# Patient Record
Sex: FEMALE | Race: WHITE | URBAN - METROPOLITAN AREA
[De-identification: names, ages, dates, MRNs, and addresses within clinical notes are randomized per-mention and may not be internally consistent; named-entity substitution may affect disease eponyms.]

---

## 2017-03-07 ENCOUNTER — OFFICE VISIT (OUTPATIENT)
Dept: OBGYN | Facility: CLINIC | Age: 67
End: 2017-03-07
Payer: COMMERCIAL

## 2017-03-07 VITALS
WEIGHT: 174 LBS | DIASTOLIC BLOOD PRESSURE: 66 MMHG | SYSTOLIC BLOOD PRESSURE: 152 MMHG | BODY MASS INDEX: 32.02 KG/M2 | HEIGHT: 62 IN

## 2017-03-07 DIAGNOSIS — R53.83 FATIGUE, UNSPECIFIED TYPE: ICD-10-CM

## 2017-03-07 DIAGNOSIS — C54.1 ENDOMETRIAL CANCER (H): Primary | ICD-10-CM

## 2017-03-07 LAB — TSH SERPL DL<=0.005 MIU/L-ACNC: 1.05 MU/L (ref 0.4–4)

## 2017-03-07 PROCEDURE — 99213 OFFICE O/P EST LOW 20 MIN: CPT | Performed by: OBSTETRICS & GYNECOLOGY

## 2017-03-07 PROCEDURE — 87624 HPV HI-RISK TYP POOLED RSLT: CPT | Performed by: OBSTETRICS & GYNECOLOGY

## 2017-03-07 PROCEDURE — 88175 CYTOPATH C/V AUTO FLUID REDO: CPT | Performed by: OBSTETRICS & GYNECOLOGY

## 2017-03-07 PROCEDURE — 86800 THYROGLOBULIN ANTIBODY: CPT | Performed by: OBSTETRICS & GYNECOLOGY

## 2017-03-07 PROCEDURE — 84443 ASSAY THYROID STIM HORMONE: CPT | Performed by: OBSTETRICS & GYNECOLOGY

## 2017-03-07 PROCEDURE — 36415 COLL VENOUS BLD VENIPUNCTURE: CPT | Performed by: OBSTETRICS & GYNECOLOGY

## 2017-03-07 NOTE — LETTER
[facility] [address] , [address]  [place]   [id number] [phone]      3/14/2017     Sydni Bonilla    [address] [address]  [place] [address]      Dear Sydni,  We are happy to inform you that your PAP smear result is normal.  We are now able to do a follow up test on PAP smears. The DNA test is for HPV (Human Papilloma Virus). Cervical cancer is closely linked with certain types of HPV. Your result showed no evidence of HPV.  Therefore we recommend you return in 6 months for your next pap smear.  You will still need to return to the clinic every year for an annual exam and other preventive tests.  Please contact the clinic with any questions.  Sincerely,    Fidencio Soto MD

## 2017-03-07 NOTE — MR AVS SNAPSHOT
After Visit Summary   3/7/2017    Sydni Bonilla    MRN: [id number]           Patient Information     Date Of Birth          1950        Visit Information        Provider Department      3/7/2017 1:15 PM Fidencio Mora MD [facility]        Today's Diagnoses     Endometrial cancer (H)    -  1    Fatigue, unspecified type           Follow-ups after your visit        Your next 10 appointments already scheduled     Jun 06, 2017  1:15 PM CDT   SHORT with Fidencio Mora MD   [facility]    [address] [place] [id number] [phone]              Who to contact     If you have questions or need follow up information about today's clinic visit or your schedule please contact [facility] directly at [phone].  Normal or non-critical lab and imaging results will be communicated to you by MyChart, letter or phone within 4 business days after the clinic has received the results. If you do not hear from us within 7 days, please contact the clinic through [employer] or phone. If you have a critical or abnormal lab result, we will notify you by phone as soon as possible.  Submit refill requests through [employer] or call your pharmacy and they will forward the refill request to us. Please allow 3 business days for your refill to be completed.          Additional Information About Your Visit        MyChart Information     [employer] gives you secure access to your electronic health record. If you see a primary care provider, you can also send messages to your care team and make appointments. If you have questions, please call your primary care clinic.  If you do not have a primary care provider, please call [phone] and they will assist you.        Care EveryWhere ID     This is your Care EveryWhere ID. This could be used by other organizations to access your [facility]  "records  [id number]        Your Vitals Were     Height BMI (Body Mass Index)                5' 2\" (1.575 m) 31.83 kg/m2           Blood Pressure from Last 3 Encounters:   03/07/17 152/66   12/06/16 138/80   08/25/16 130/70    Weight from Last 3 Encounters:   03/07/17 174 lb (78.9 kg)   12/06/16 170 lb (77.1 kg)   08/25/16 174 lb (78.9 kg)              We Performed the Following     Anti thyroglobulin antibody     TSH with free T4 reflex        Primary Care Provider Office Phone # Fax #    Joan Vines [phone]       [facility] [address] [address]        Thank you!     Thank you for choosing [facility]  for your care. Our goal is always to provide you with excellent care. Hearing back from our patients is one way we can continue to improve our services. Please take a few minutes to complete the written survey that you may receive in the mail after your visit with us. Thank you!             Your Updated Medication List - Protect others around you: Learn how to safely use, store and throw away your medicines at www.disposemymeds.org.          This list is accurate as of: 3/7/17  2:20 PM.  Always use your most recent med list.                   Brand Name Dispense Instructions for use    aspirin EC 81 MG EC tablet      Take 1 tablet by mouth       Blood Glucose Monitor System W/DEVICE Kit      Test 4 times a day, One touch Ultra blue test strips 3 months supply       carvedilol 6.25 MG tablet    COREG     Take 1 tablet by mouth 2 times daily (with meals).       FUROSEMIDE PO      Take 10 mg by mouth daily 1/2 of 20 mg tablet       ibuprofen 800 MG tablet    ADVIL/MOTRIN    30 tablet    Take 1 tablet (800 mg) by mouth every 8 hours as needed for moderate pain       insulin glargine 100 UNIT/ML injection    LANTUS     Inject 25 Units Subcutaneous At Bedtime       metFORMIN 500 MG 24 hr tablet    GLUCOPHAGE-XR     Take 1,500 mg by mouth " "daily (with breakfast)       Multi-vitamin Tabs tablet      Take 1 tablet by mouth daily       nitroglycerin 0.4 MG sublingual tablet    NITROSTAT     Place 0.4 mg under the tongue as needed for chest pain       OMEGA-3 FISH OIL PO      Take 2.8 g by mouth daily       pen needles 5/16\" 31G X 8 MM Misc      Dispense: Insulin Syringes:  1/2 cc syringes (# 270 )  - regular length 28 mm  needle       SPIRONOLACTONE PO      Take 25 mg by mouth every evening 1/2 of 50 mg tablet       VITAMIN D3 PO      Take 20,000 Units by mouth daily Liquid Vitamin D 2000 units per drop and takes 10 drops in smoothie.         "

## 2017-03-07 NOTE — PROGRESS NOTES
SUBJECTIVE:                                                   Sydni Bonilla is a 67 year old female who presents to clinic today for the following health issue(s):  Patient presents with:  RECHECK: 3 month follow-up uterine cancer.      HPI: The patient this time for follow-up of endometrial cancer first diagnosed and treated in November 2015. She has no positive GYN review of systems at this time. She has some strange arthritis symptoms and has a worsening of her coronary artery disease. She has an internist at his following these issues. She is desirous of a full set of thyroid functions.      No LMP recorded. Patient is postmenopausal..   Patient is not sexually active,Using hysterectomy for contraception.    reports that she has never smoked. She has never used smokeless tobacco.  Health maintenance updated:  yes    Today's PHQ-2 Score:   PHQ-2 ( 1999 Pfizer) 3/7/2017   Q1: Little interest or pleasure in doing things 0   Q2: Feeling down, depressed or hopeless 0   PHQ-2 Score 0       Problem list and histories reviewed & adjusted, as indicated.  Additional history: as documented.    Patient Active Problem List   Diagnosis     CARDIOVASCULAR SCREENING; LDL GOAL LESS THAN 160     Endometrial cancer (H)     Adenocarcinoma of the endometrium/uterus (H)     Uterine cancer (H)     Aphasia     Past Surgical History   Procedure Laterality Date     Colonoscopy       Gyn surgery       D&C x3     Ent surgery       tonsillectomy     Hrw hb pf i&d sebaceous cyst       x2     Hysterectomy total abdominal, bilateral salpingo-oophorectomy, combined N/A 11/12/2015     Procedure: COMBINED HYSTERECTOMY TOTAL ABDOMINAL, SALPINGO-OOPHORECTOMY;  Surgeon: Fidencio Mora MD;  [facility]: [facility]      Social History   Substance Use Topics     Smoking status: Never Smoker     Smokeless tobacco: Never Used     Alcohol use No           Current Outpatient Prescriptions   Medication Sig     insulin glargine (LANTUS) 100 UNIT/ML vial  "Inject 25 Units Subcutaneous At Bedtime     metFORMIN (GLUCOPHAGE-XR) 500 MG 24 hr tablet Take 1,500 mg by mouth daily (with breakfast)     aspirin EC 81 MG tablet Take 1 tablet by mouth     Insulin Pen Needle (PEN NEEDLES 5/16\") 31G X 8 MM MISC Dispense: Insulin Syringes:  1/2 cc syringes (# 270 )  - regular length 28 mm  needle     Blood Glucose Monitoring Suppl (BLOOD GLUCOSE MONITOR SYSTEM) W/DEVICE KIT Test 4 times a day, One touch Ultra blue test strips 3 months supply     ibuprofen (ADVIL,MOTRIN) 800 MG tablet Take 1 tablet (800 mg) by mouth every 8 hours as needed for moderate pain     FUROSEMIDE PO Take 10 mg by mouth daily 1/2 of 20 mg tablet     SPIRONOLACTONE PO Take 25 mg by mouth every evening 1/2 of 50 mg tablet     Cholecalciferol (VITAMIN D3 PO) Take 20,000 Units by mouth daily Liquid Vitamin D 2000 units per drop and takes 10 drops in smoothie.     nitroglycerin (NITROSTAT) 0.4 MG SL tablet Place 0.4 mg under the tongue as needed for chest pain     Omega-3 Fatty Acids (OMEGA-3 FISH OIL PO) Take 2.8 g by mouth daily      multivitamin, therapeutic with minerals (MULTI-VITAMIN) TABS Take 1 tablet by mouth daily     carvedilol (COREG) 6.25 MG tablet Take 1 tablet by mouth 2 times daily (with meals).     No current facility-administered medications for this visit.      Allergies   Allergen Reactions     Adhesive Tape      Amlodipine      Apple      Avandia [Rosiglitazone]      Benicar [Olmesartan]      Beta Adrenergic Blockers      Birch Trees      Caffeine      Bianchi      Other reaction(s): Other (see comments)  Tingling and tightness in throat and mouth.     Cherry [Wild Cherry Syrup]      Chlorthalidone      Clonidine      Coffee Bean Extract [Coffea Arabica]      Cortisone      Cozaar [Losartan]      Dyazide [Hydrochlorothiazide W/Triamterene]      Flagyl [Metronidazole]      Gluten Meal      Hc [Hydrocortisone]      Hmg-Coa-R Inhibitors      Hytrin [Terazosin]      Latex      Lisinopril      Lopid " "[Gemfibrozil]      Lozol [Indapamide]      Maple Tree      Metformin      Mold      Neomycin-Polymyxin B Gu      No Clinical Screening - See Comments      \"too many to remember\"     Oak Trees      Oatmeal      Peanut Oil      Rosuvastatin      Soy Allergy      Sulfa Drugs      Triamcinolone      Verapamil      Yeast        ROS:  12 point review of systems negative other than symptoms noted below.    OBJECTIVE:     /66  Ht 5' 2\" (1.575 m)  Wt 174 lb (78.9 kg)  BMI 31.83 kg/m2  Body mass index is 31.83 kg/(m^2).    Exam:  Constitutional:  Appearance: Well nourished, well developed alert, in no acute distress  Neck:  Lymph Nodes:  No lymphadenopathy present; Thyroid:  Gland size normal, nontender, no nodules or masses present on palpation  Chest:  Respiratory Effort:  Breathing unlabored  Cardiovascular: Heart: Auscultation:  Regular rate, normal rhythm, no murmurs present  Gastrointestinal:  Abdominal Examination:  Abdomen nontender to palpation, tone normal without rigidity or guarding, no masses present, umbilicus without lesions; Liver/Spleen:  No hepatomegaly present, liver nontender to palpation; Hernias:  No hernias present  Lymphatic: Lymph Nodes:  No other lymphadenopathy present  Skin:General Inspection:  No rashes present, no lesions present, no areas of discoloration; Genitalia and Groin:  No rashes present, no lesions present, no areas of discoloration, no masses present.  Neurologic/Psychiatric:  Mental Status:  Oriented X3   Pelvic Exam:  External Genitalia:     Normal appearance for age, no discharge present, no tenderness present, no inflammatory lesions present, color normal  Vagina:     Normal vaginal vault without central or paravaginal defects, no discharge present, no inflammatory lesions present, no masses present  Bladder:     Nontender to palpation  Urethra:   Urethral Body:  Urethra palpation normal, urethra structural support normal   Urethral Meatus:  No erythema or lesions " present  Cervix:     Surgically absent  Uterus:     Surgically absent  Adnexa:     Surgically absent  Perineum:     Perineum within normal limits, no evidence of trauma, no rashes or skin lesions present  Anus:     Anus within normal limits, no hemorrhoids present  Inguinal Lymph Nodes:     No lymphadenopathy present     In-Clinic Test Results:      ASSESSMENT/PLAN:                                                      The patient is seen at this time for follow-up of adenocarcinoma the endometrium. She has no evidence of recurrence. A Pap smear is pending. We will draw some thyroid functions and contact her with the results.            Fidencio Mora MD  [facility] [place]

## 2017-03-08 LAB — THYROGLOB AB SERPL IA-ACNC: <20 IU/ML (ref 0–40)

## 2017-03-10 LAB
COPATH REPORT: NORMAL
PAP: NORMAL

## 2017-03-13 LAB
FINAL DIAGNOSIS: NORMAL
HPV HR 12 DNA CVX QL NAA+PROBE: NEGATIVE
HPV16 DNA SPEC QL NAA+PROBE: NEGATIVE
HPV18 DNA SPEC QL NAA+PROBE: NEGATIVE
SPECIMEN DESCRIPTION: NORMAL

## 2017-03-15 ENCOUNTER — TELEPHONE (OUTPATIENT)
Dept: NURSING | Facility: CLINIC | Age: 67
End: 2017-03-15

## 2017-03-15 NOTE — TELEPHONE ENCOUNTER
Pt called requesting lab results from her visit 3/7/17. Pt give TSH and Anti thyroglobulin Antibody results. Pt results that test be released to my chart. Pt has an appointment with endocrinology soon.  Results released to My Chart.

## 2017-06-06 ENCOUNTER — OFFICE VISIT (OUTPATIENT)
Dept: OBGYN | Facility: CLINIC | Age: 67
End: 2017-06-06
Payer: COMMERCIAL

## 2017-06-06 VITALS
WEIGHT: 175 LBS | BODY MASS INDEX: 32.2 KG/M2 | HEIGHT: 62 IN | SYSTOLIC BLOOD PRESSURE: 124 MMHG | DIASTOLIC BLOOD PRESSURE: 72 MMHG

## 2017-06-06 DIAGNOSIS — C54.1 ENDOMETRIAL CANCER (H): Primary | ICD-10-CM

## 2017-06-06 PROCEDURE — 99213 OFFICE O/P EST LOW 20 MIN: CPT | Performed by: OBSTETRICS & GYNECOLOGY

## 2017-06-06 PROCEDURE — 88175 CYTOPATH C/V AUTO FLUID REDO: CPT | Performed by: OBSTETRICS & GYNECOLOGY

## 2017-06-06 NOTE — PROGRESS NOTES
SUBJECTIVE:                                                   Sydni Bonilla is a 67 year old female who presents to clinic today for the following health issue(s):  Patient presents with:  Repeat Pap Smear: Would like to not do the HPV testing if possible, says it's $100      HPI: The patient is seen at this time for her latest follow-up from adenocarcinoma of the endometrium diagnosed in November 2015. She is asymptomatic with no respiratory GI or  problems. She denies any vaginal bleeding. She does have a recent problem with gout and is treating this with herbal medicine. The patient refuses mammography due to radiation exposure.      No LMP recorded. Patient is postmenopausal..   Patient is not sexually active, No obstetric history on file..  Using not sexually active for contraception.    reports that she has never smoked. She has never used smokeless tobacco.    STD testing offered?  Declined    Health maintenance updated:  yes    Today's PHQ-2 Score:   PHQ-2 ( 1999 Pfizer) 3/7/2017   Q1: Little interest or pleasure in doing things 0   Q2: Feeling down, depressed or hopeless 0   PHQ-2 Score 0     Today's PHQ-9 Score: No flowsheet data found.  Today's MONICO-7 Score: No flowsheet data found.    Problem list and histories reviewed & adjusted, as indicated.  Additional history: as documented.    Patient Active Problem List   Diagnosis     CARDIOVASCULAR SCREENING; LDL GOAL LESS THAN 160     Endometrial cancer (H)     Adenocarcinoma of the endometrium/uterus (H)     Uterine cancer (H)     Aphasia     Past Surgical History:   Procedure Laterality Date     COLONOSCOPY       ENT SURGERY      tonsillectomy     GYN SURGERY      D&C x3     HRW HB PF I&D SEBACEOUS CYST      x2     HYSTERECTOMY TOTAL ABDOMINAL, BILATERAL SALPINGO-OOPHORECTOMY, COMBINED N/A 11/12/2015    Procedure: COMBINED HYSTERECTOMY TOTAL ABDOMINAL, SALPINGO-OOPHORECTOMY;  Surgeon: Fidencio Mora MD;  Location: [facility]      Social History  "  Substance Use Topics     Smoking status: Never Smoker     Smokeless tobacco: Never Used     Alcohol use No           Current Outpatient Prescriptions   Medication Sig     insulin glargine (LANTUS) 100 UNIT/ML vial Inject 25 Units Subcutaneous At Bedtime     metFORMIN (GLUCOPHAGE-XR) 500 MG 24 hr tablet Take 1,500 mg by mouth daily (with breakfast)     aspirin EC 81 MG tablet Take 1 tablet by mouth     Insulin Pen Needle (PEN NEEDLES 5/16\") 31G X 8 MM MISC Dispense: Insulin Syringes:  1/2 cc syringes (# 270 )  - regular length 28 mm  needle     Blood Glucose Monitoring Suppl (BLOOD GLUCOSE MONITOR SYSTEM) W/DEVICE KIT Test 4 times a day, One touch Ultra blue test strips 3 months supply     FUROSEMIDE PO Take 10 mg by mouth daily 1/2 of 20 mg tablet     SPIRONOLACTONE PO Take 25 mg by mouth every evening 1/2 of 50 mg tablet     Cholecalciferol (VITAMIN D3 PO) Take 20,000 Units by mouth daily Liquid Vitamin D 2000 units per drop and takes 10 drops in smoothie.     Omega-3 Fatty Acids (OMEGA-3 FISH OIL PO) Take 2.8 g by mouth daily      multivitamin, therapeutic with minerals (MULTI-VITAMIN) TABS Take 1 tablet by mouth daily     carvedilol (COREG) 6.25 MG tablet Take 1 tablet by mouth 2 times daily (with meals).     ibuprofen (ADVIL,MOTRIN) 800 MG tablet Take 1 tablet (800 mg) by mouth every 8 hours as needed for moderate pain (Patient not taking: Reported on 6/6/2017)     nitroglycerin (NITROSTAT) 0.4 MG SL tablet Place 0.4 mg under the tongue as needed for chest pain     No current facility-administered medications for this visit.      Allergies   Allergen Reactions     Hytrin [Terazosin] Shortness Of Breath     No change in BP     Metformin      Other reaction(s): Other (see comments)  Acid reflux     Rosuvastatin      Other reaction(s): Abdominal Pain, Hyperglycemia, Myalgia     Adhesive Tape Itching     Amlodipine      Other reaction(s): Other (see comments)  Liver and upper abd pain     Apple      Other " "reaction(s): Other (see comments)  Tingling and tightness in throat     Atenolol Visual Disturbance     TIA type rx, vision loss     Avandia [Rosiglitazone]      Benicar [Olmesartan] Swelling     R sided edema, throat swelling & tightness     Beta Adrenergic Blockers      Birch Trees      Caffeine      Other reaction(s): Tachycardia     Cherry      Other reaction(s): Other (see comments)  Tingling and tightness in throat and mouth.  Other reaction(s): Other (see comments)  Tingling and tightness in throat and mouth.     Cherry [Wild Cherry Syrup]      Chlorthalidone      Other reaction(s): Other (see comments)  Potassium loss     Clonidine      Clonidine Derivatives      Other reaction(s): Other (see comments)  Thrush from patch, parotid pain from oral     Coffee Bean Extract [Coffea Arabica]      Cortisone      Cozaar [Losartan]      Dyazide [Hydrochlorothiazide W/Triamterene]      Other reaction(s): Other (see comments)  Severe abd cramping     Flagyl [Metronidazole]      Other reaction(s): Tachycardia     Gluten Meal      Hc [Hydrocortisone]      Hmg-Coa-R Inhibitors      Latex      Lisinopril Nausea and Vomiting     Lopid [Gemfibrozil]      Other reaction(s): Unknown     Lozol [Indapamide]      Other reaction(s): Unknown     Maple Tree      Mold      Molds & Smuts      Other reaction(s): Headache  Sorethroat, itchy ears,     Neomycin-Polymyxin B Gu      No Clinical Screening - See Comments      Other reaction(s): Abdominal Pain  Other reaction(s): Other (see comments)  HTN  \"too many to remember\"     Oak Trees      Oatmeal      Peanut Oil      Soy Allergy      Sulfa Drugs      Other reaction(s): Other (see comments)  Hemorrhages in white of eyes     Triamcinolone      Triple Antibiotic Itching     wound     Verapamil      Other reaction(s): Edema     Yeast      Other reaction(s): Other (see comments)  Patient states she develops Candida when eating yeast containing products. Vaginal odor, nasal discharge, " "fatigue, \"cloudy\" thinking.       ROS:  12 point review of systems negative other than symptoms noted below.  Constitutional: Fatigue  Musculoskeletal: Muscle Cramps    OBJECTIVE:     /72  Ht 5' 2\" (1.575 m)  Wt 175 lb (79.4 kg)  BMI 32.01 kg/m2  Body mass index is 32.01 kg/(m^2).    Exam:  Constitutional:  Appearance: Well nourished, well developed alert, in no acute distress  Neck:  Lymph Nodes:  No lymphadenopathy present; Thyroid:  Gland size normal, nontender, no nodules or masses present on palpation  Chest:  Respiratory Effort:  Breathing unlabored  Cardiovascular: Heart: Auscultation:  Regular rate, normal rhythm, no murmurs present  Gastrointestinal:  Abdominal Examination:  Abdomen nontender to palpation, tone normal without rigidity or guarding, no masses present, umbilicus without lesions; Liver/Spleen:  No hepatomegaly present, liver nontender to palpation; Hernias:  No hernias present  Lymphatic: Lymph Nodes:  No other lymphadenopathy present  Skin:General Inspection:  No rashes present, no lesions present, no areas of discoloration; Genitalia and Groin:  No rashes present, no lesions present, no areas of discoloration, no masses present.  Neurologic/Psychiatric:  Mental Status:  Oriented X3   Pelvic Exam:  External Genitalia:     Normal appearance for age, no discharge present, no tenderness present, no inflammatory lesions present, color normal  Vagina:     Normal vaginal vault without central or paravaginal defects, no discharge present, no inflammatory lesions present, no masses present  Bladder:     Nontender to palpation  Urethra:   Urethral Body:  Urethra palpation normal, urethra structural support normal   Urethral Meatus:  No erythema or lesions present  Cervix:     Surgically absent  Uterus:     Surgically absent  Adnexa:     Surgically absent  Perineum:     Perineum within normal limits, no evidence of trauma, no rashes or skin lesions present  Anus:     Anus within normal limits, " no hemorrhoids present  Inguinal Lymph Nodes:     No lymphadenopathy present     In-Clinic Test Results:      ASSESSMENT/PLAN:                                                          67-year-old patient with history of adenocarcinoma the endometrium with no evidence of recurrence.        Fidencio Mora MD  [facility] [place] [place]

## 2017-06-06 NOTE — MR AVS SNAPSHOT
After Visit Summary   6/6/2017    Sydni Bonilla    MRN: [id number]           Patient Information     Date Of Birth          1950        Visit Information        Provider Department      6/6/2017 1:15 PM Fidencio Mora MD [facility]        Today's Diagnoses     Endometrial cancer (H)    -  1       Follow-ups after your visit        Your next 10 appointments already scheduled     Sep 12, 2017  1:30 PM CDT   SHORT with Fidencio Mora MD   [facility] ([facility])    [address] [place] [id number] [phone]              Who to contact     If you have questions or need follow up information about today's clinic visit or your schedule please contact [facility] directly at [phone].  Normal or non-critical lab and imaging results will be communicated to you by [employer], letter or phone within 4 business days after the clinic has received the results. If you do not hear from us within 7 days, please contact the clinic through [employer] or phone. If you have a critical or abnormal lab result, we will notify you by phone as soon as possible.  Submit refill requests through [employer] or call your pharmacy and they will forward the refill request to us. Please allow 3 business days for your refill to be completed.          Additional Information About Your Visit        MyChart Information     [employer] gives you secure access to your electronic health record. If you see a primary care provider, you can also send messages to your care team and make appointments. If you have questions, please call your primary care clinic.  If you do not have a primary care provider, please call [phone] and they will assist you.        Care EveryWhere ID     This is your Care EveryWhere ID. This could be used by other organizations to access your [facility] medical records  [id number]        Your Vitals Were   "   Height BMI (Body Mass Index)                5' 2\" (1.575 m) 32.01 kg/m2           Blood Pressure from Last 3 Encounters:   06/06/17 124/72   03/07/17 152/66   12/06/16 138/80    Weight from Last 3 Encounters:   06/06/17 175 lb (79.4 kg)   03/07/17 174 lb (78.9 kg)   12/06/16 170 lb (77.1 kg)              We Performed the Following     PAP imaged thin layer, diagnostic        Primary Care Provider Office Phone # Fax #    Joan Vines [phone]       [facility] INTERNAL MEDICINE [address] [facility] [address]  [facility] [address]        Thank you!     Thank you for choosing [facility] [place]  for your care. Our goal is always to provide you with excellent care. Hearing back from our patients is one way we can continue to improve our services. Please take a few minutes to complete the written survey that you may receive in the mail after your visit with us. Thank you!             Your Updated Medication List - Protect others around you: Learn how to safely use, store and throw away your medicines at www.disposemymeds.org.          This list is accurate as of: 6/6/17  2:02 PM.  Always use your most recent med list.                   Brand Name Dispense Instructions for use    aspirin EC 81 MG EC tablet      Take 1 tablet by mouth       Blood Glucose Monitor System W/DEVICE Kit      Test 4 times a day, One touch Ultra blue test strips 3 months supply       carvedilol 6.25 MG tablet    COREG     Take 1 tablet by mouth 2 times daily (with meals).       FUROSEMIDE PO      Take 10 mg by mouth daily 1/2 of 20 mg tablet       ibuprofen 800 MG tablet    ADVIL/MOTRIN    30 tablet    Take 1 tablet (800 mg) by mouth every 8 hours as needed for moderate pain       insulin glargine 100 UNIT/ML injection    LANTUS     Inject 25 Units Subcutaneous At Bedtime       metFORMIN 500 MG 24 hr tablet    GLUCOPHAGE-XR     Take 1,500 mg by mouth daily (with breakfast)       Multi-vitamin Tabs tablet      Take 1 " "tablet by mouth daily       nitroglycerin 0.4 MG sublingual tablet    NITROSTAT     Place 0.4 mg under the tongue as needed for chest pain       OMEGA-3 FISH OIL PO      Take 2.8 g by mouth daily       pen needles 5/16\" 31G X 8 MM Misc      Dispense: Insulin Syringes:  1/2 cc syringes (# 270 )  - regular length 28 mm  needle       SPIRONOLACTONE PO      Take 25 mg by mouth every evening 1/2 of 50 mg tablet       VITAMIN D3 PO      Take 20,000 Units by mouth daily Liquid Vitamin D 2000 units per drop and takes 10 drops in smoothie.         "

## 2017-06-12 LAB
COPATH REPORT: NORMAL
PAP: NORMAL

## 2017-06-13 ENCOUNTER — RESULT FOLLOW UP (OUTPATIENT)
Dept: OBGYN | Facility: CLINIC | Age: 67
End: 2017-06-13

## 2017-06-13 DIAGNOSIS — C54.1 ADENOCARCINOMA OF THE ENDOMETRIUM/UTERUS (H): ICD-10-CM

## 2017-06-13 NOTE — LETTER
March 19, 2019      Sydni Bonilla   [address] [address]  [place] [address]    Dear ,      This letter is to remind you that you are due for your follow up PAP smear on or about 04/01/19.    Please call [phone] to schedule your appointment at your earliest convenience.     If you have completed the tests outside of [place], please have the results forwarded to our office. We will update the chart for your primary Physician to review before your next annual physical.     Sincerely,      Fidencio Mora MD/[facility]

## 2017-06-13 NOTE — PROGRESS NOTES
11/12/15 Hysterectomy  Pap protocal for f/up of endometrial cancer. q3mo for first two years (11/2017) and then q 6 mo for 2 years (11/2019):  02/26/16 NIL, Neg HPV  05/20/16 NIL, Neg HPV  08/25/16 NIL, Neg HPV  12/06/16 NIL  03/17/17 NIL, Neg HPV  06/06/17 NIL. Repeat 3 month, due 9/2017 09/20/17 NIL/neg HPV. Plan: co-test in 6 mo. ([facility])  04/04/18 NIL. Plan: pap in 6mo. ([facility])  10/1/18 NIL vaginal pap. Neg HPV. Plan pap in 6 months. ([facility])  03/19/19 Pap reminder letter sent. ([facility])  04/11/19 Spoke with pt, states she will not return for any more paps smears as they are too painful. ([facility])

## 2017-09-20 ENCOUNTER — OFFICE VISIT (OUTPATIENT)
Dept: OBGYN | Facility: CLINIC | Age: 67
End: 2017-09-20
Payer: COMMERCIAL

## 2017-09-20 VITALS
DIASTOLIC BLOOD PRESSURE: 60 MMHG | BODY MASS INDEX: 32.42 KG/M2 | HEIGHT: 62 IN | WEIGHT: 176.2 LBS | SYSTOLIC BLOOD PRESSURE: 124 MMHG

## 2017-09-20 DIAGNOSIS — C54.1 ENDOMETRIAL CANCER (H): Primary | ICD-10-CM

## 2017-09-20 PROCEDURE — 87624 HPV HI-RISK TYP POOLED RSLT: CPT | Performed by: OBSTETRICS & GYNECOLOGY

## 2017-09-20 PROCEDURE — 99213 OFFICE O/P EST LOW 20 MIN: CPT | Performed by: OBSTETRICS & GYNECOLOGY

## 2017-09-20 PROCEDURE — 88175 CYTOPATH C/V AUTO FLUID REDO: CPT | Performed by: OBSTETRICS & GYNECOLOGY

## 2017-09-20 NOTE — PROGRESS NOTES
SUBJECTIVE:                                                   Sydni Bonilla is a 67 year old female who presents to clinic today for the following health issue(s):  Patient presents with:  Repeat Pap Smear        HPI: The patient is seen at this time for follow-up of adenocarcinoma of the endometrium. Her initial surgery for staging was in November 2015. Interval visits have shown no evidence of recurrence.  The patient has no respiratory embarrassment abdominal pain and change in GI or  function or vaginal bleeding.    No LMP recorded. Patient has had a hysterectomy..   Patient is not sexually active, No obstetric history on file..  Using hysterectomy for contraception.    reports that she has never smoked. She has never used smokeless tobacco.      STD testing offered?  Declined    Health maintenance updated:  yes    Today's PHQ-2 Score:   PHQ-2 ( 1999 Pf[employer]) 3/7/2017   Q1: Little interest or pleasure in doing things 0   Q2: Feeling down, depressed or hopeless 0   PHQ-2 Score 0     Today's PHQ-9 Score: No flowsheet data found.  Today's MONICO-7 Score: No flowsheet data found.    Problem list and histories reviewed & adjusted, as indicated.  Additional history: as documented.    Patient Active Problem List   Diagnosis     CARDIOVASCULAR SCREENING; LDL GOAL LESS THAN 160     Endometrial cancer (H)     Adenocarcinoma of the endometrium/uterus (H)     Uterine cancer (H)     Aphasia     Past Surgical History:   Procedure Laterality Date     COLONOSCOPY       ENT SURGERY      tonsillectomy     GYN SURGERY      D&C x3     HRW HB PF I&D SEBACEOUS CYST      x2     HYSTERECTOMY TOTAL ABDOMINAL, BILATERAL SALPINGO-OOPHORECTOMY, COMBINED N/A 11/12/2015    Procedure: COMBINED HYSTERECTOMY TOTAL ABDOMINAL, SALPINGO-OOPHORECTOMY;  Surgeon: Fidencio Mora MD;  [facility]      Social History   Substance Use Topics     Smoking status: Never Smoker     Smokeless tobacco: Never Used     Alcohol use No           Current  "Outpatient Prescriptions   Medication Sig     insulin aspart (NOVOLOG PENFILL) 100 UNIT/ML injection Inject Subcutaneous 4 times daily (with meals and nightly)     aspirin EC 81 MG tablet Take 1 tablet by mouth     Insulin Pen Needle (PEN NEEDLES 5/16\") 31G X 8 MM MISC Dispense: Insulin Syringes:  1/2 cc syringes (# 270 )  - regular length 28 mm  needle     Blood Glucose Monitoring Suppl (BLOOD GLUCOSE MONITOR SYSTEM) W/DEVICE KIT Test 4 times a day, One touch Ultra blue test strips 3 months supply     FUROSEMIDE PO Take 10 mg by mouth daily 1/2 of 20 mg tablet     SPIRONOLACTONE PO Take 25 mg by mouth every evening 1/2 of 50 mg tablet     Cholecalciferol (VITAMIN D3 PO) Take 20,000 Units by mouth daily Liquid Vitamin D 2000 units per drop and takes 10 drops in smoothie.     nitroglycerin (NITROSTAT) 0.4 MG SL tablet Place 0.4 mg under the tongue as needed for chest pain     Omega-3 Fatty Acids (OMEGA-3 FISH OIL PO) Take 2.8 g by mouth daily      multivitamin, therapeutic with minerals (MULTI-VITAMIN) TABS Take 1 tablet by mouth daily     carvedilol (COREG) 6.25 MG tablet Take 1 tablet by mouth 2 times daily (with meals).     No current facility-administered medications for this visit.      Allergies   Allergen Reactions     Hytrin [Terazosin] Shortness Of Breath     No change in BP     Metformin      Other reaction(s): Other (see comments)  Acid reflux     Rosuvastatin      Other reaction(s): Abdominal Pain, Hyperglycemia, Myalgia     Adhesive Tape Itching     Amlodipine      Other reaction(s): Other (see comments)  Liver and upper abd pain     Apple      Other reaction(s): Other (see comments)  Tingling and tightness in throat     Atenolol Visual Disturbance     TIA type rx, vision loss     Avandia [Rosiglitazone]      Benicar [Olmesartan] Swelling     R sided edema, throat swelling & tightness     Beta Adrenergic Blockers      Birch Trees      Caffeine      Other reaction(s): Tachycardia     Cherry      Other " "reaction(s): Other (see comments)  Tingling and tightness in throat and mouth.  Other reaction(s): Other (see comments)  Tingling and tightness in throat and mouth.     Cherry [Wild Cherry Syrup]      Chlorthalidone      Other reaction(s): Other (see comments)  Potassium loss     Clonidine      Clonidine Derivatives      Other reaction(s): Other (see comments)  Thrush from patch, parotid pain from oral     Coffee Bean Extract [Coffea Arabica]      Cortisone      Cozaar [Losartan]      Dyazide [Hydrochlorothiazide W/Triamterene]      Other reaction(s): Other (see comments)  Severe abd cramping     Flagyl [Metronidazole]      Other reaction(s): Tachycardia     Gluten Meal      Hc [Hydrocortisone]      Hmg-Coa-R Inhibitors      Latex      Lisinopril Nausea and Vomiting     Lopid [Gemfibrozil]      Other reaction(s): Unknown     Lozol [Indapamide]      Other reaction(s): Unknown     Maple Tree      Mold      Molds & Smuts      Other reaction(s): Headache  Sorethroat, itchy ears,     Neomycin-Polymyxin B Gu      No Clinical Screening - See Comments      Other reaction(s): Abdominal Pain  Other reaction(s): Other (see comments)  HTN  \"too many to remember\"     Oak Trees      Oatmeal      Peanut Oil      Soy Allergy      Sulfa Drugs      Other reaction(s): Other (see comments)  Hemorrhages in white of eyes     Triamcinolone      Triple Antibiotic Itching     wound     Verapamil      Other reaction(s): Edema     Yeast      Other reaction(s): Other (see comments)  Patient states she develops Candida when eating yeast containing products. Vaginal odor, nasal discharge, fatigue, \"cloudy\" thinking.       ROS:  12 point review of systems negative other than symptoms noted below.  Constitutional: Fatigue  Musculoskeletal: Muscular Weakness    OBJECTIVE:     /60  Ht 5' 2\" (1.575 m)  Wt 176 lb 3.2 oz (79.9 kg)  BMI 32.23 kg/m2  Body mass index is 32.23 kg/(m^2).    Exam:  Constitutional:  Appearance: Well nourished, well " developed alert, in no acute distress  Neck:  Lymph Nodes:  No lymphadenopathy present; Thyroid:  Gland size normal, nontender, no nodules or masses present on palpation  Chest:  Respiratory Effort:  Breathing unlabored  Cardiovascular: Heart: Auscultation:  Regular rate, normal rhythm, no murmurs present  Gastrointestinal:  Abdominal Examination:  Abdomen nontender to palpation, tone normal without rigidity or guarding, no masses present, umbilicus without lesions; Liver/Spleen:  No hepatomegaly present, liver nontender to palpation; Hernias:  No hernias present  Lymphatic: Lymph Nodes:  No other lymphadenopathy present  Skin:General Inspection:  No rashes present, no lesions present, no areas of discoloration; Genitalia and Groin:  No rashes present, no lesions present, no areas of discoloration, no masses present.  Neurologic/Psychiatric:  Mental Status:  Oriented X3   Pelvic Exam:  External Genitalia:     Normal appearance for age, no discharge present, no tenderness present, no inflammatory lesions present, color normal  Vagina:     Normal vaginal vault without central or paravaginal defects, no discharge present, no inflammatory lesions present, no masses present  Bladder:     Nontender to palpation  Urethra:   Urethral Body:  Urethra palpation normal, urethra structural support normal   Urethral Meatus:  No erythema or lesions present  Cervix:     Surgically absent  Uterus:     Surgically absent  Adnexa:     Surgically absent  Perineum:     Perineum within normal limits, no evidence of trauma, no rashes or skin lesions present  Anus:     Anus within normal limits, no hemorrhoids present  Inguinal Lymph Nodes:     No lymphadenopathy present       In-Clinic Test Results:      ASSESSMENT/PLAN:                                                        ICD-10-CM    1. Endometrial cancer (H) C54.1 HPV High Risk Types DNA Cervical     PAP imaged thin layer, diagnostic     67-year-old patient with adenocarcinoma the  endometrium        Fidencio Mora MD  [facility] [place]

## 2017-09-20 NOTE — LETTER
October 12, 2017    Sydni Bonilla   [address] [address]  [place] [address]    Dear Sydni,  We are happy to inform you that your PAP smear result from 9/20/17 is normal.  We are now able to do a follow up test on PAP smears. The DNA test is for HPV (Human Papilloma Virus). Cervical cancer is closely linked with certain types of HPV. Your result showed no evidence of high risk HPV.  Therefore we recommend you return in 6 months for your next pap smear.  You will still need to return to the clinic every year for an annual exam and other preventive tests.  Please contact the clinic at [phone] with any questions.  Sincerely,    Fidencio Mora MD/[facility]

## 2017-09-20 NOTE — MR AVS SNAPSHOT
After Visit Summary   9/20/2017    Sydni Bonilla    MRN: [id number]           Patient Information     Date Of Birth          1950        Visit Information        Provider Department      9/20/2017 8:30 AM Fidencio Mora MD [facility]        Today's Diagnoses     Endometrial cancer (H)    -  1       Follow-ups after your visit        Your next 10 appointments already scheduled     Apr 04, 2018  9:15 AM CDT   SHORT with Fidencio Mora MD   [facility] ([facility])    [address] [place] [id number] [phone]              Who to contact     If you have questions or need follow up information about today's clinic visit or your schedule please contact [facility] directly at [phone].  Normal or non-critical lab and imaging results will be communicated to you by [employer], letter or phone within 4 business days after the clinic has received the results. If you do not hear from us within 7 days, please contact the clinic through [employer] or phone. If you have a critical or abnormal lab result, we will notify you by phone as soon as possible.  Submit refill requests through [employer] or call your pharmacy and they will forward the refill request to us. Please allow 3 business days for your refill to be completed.          Additional Information About Your Visit        MyChart Information     [employer] gives you secure access to your electronic health record. If you see a primary care provider, you can also send messages to your care team and make appointments. If you have questions, please call your primary care clinic.  If you do not have a primary care provider, please call [phone] and they will assist you.        Care EveryWhere ID     This is your Care EveryWhere ID. This could be used by other organizations to access your [facility] medical records  [id number]        Your Vitals Were  "    Height BMI (Body Mass Index)                5' 2\" (1.575 m) 32.23 kg/m2           Blood Pressure from Last 3 Encounters:   09/20/17 124/60   06/06/17 124/72   03/07/17 152/66    Weight from Last 3 Encounters:   09/20/17 176 lb 3.2 oz (79.9 kg)   06/06/17 175 lb (79.4 kg)   03/07/17 174 lb (78.9 kg)              We Performed the Following     HPV High Risk Types DNA Cervical     PAP imaged thin layer, diagnostic        Primary Care Provider Office Phone # Fax #    Joan Vines [phone]       [facility] INTERNAL MEDICINE [address] [address]        Equal Access to Services     AILIN PATRICIA : Hadii lelo briceo Sosherri, waaxda luqadaha, qaybta kaalmada adeegyada, waxtamra echevarria. So [facility] [place] [phone].    ATENCIÓN: Si habla español, tiene a salvador disposición servicios gratuitos de asistencia lingüística. [facility] [phone].    We comply with applicable federal civil rights laws and [place] laws. We do not discriminate on the basis of race, color, national origin, age, disability sex, sexual orientation or gender identity.            Thank you!     Thank you for choosing [facility] [place]  for your care. Our goal is always to provide you with excellent care. Hearing back from our patients is one way we can continue to improve our services. Please take a few minutes to complete the written survey that you may receive in the mail after your visit with us. Thank you!             Your Updated Medication List - Protect others around you: Learn how to safely use, store and throw away your medicines at www.disposemymeds.org.          This list is accurate as of: 9/20/17  9:06 AM.  Always use your most recent med list.                   Brand Name Dispense Instructions for use Diagnosis    aspirin EC 81 MG EC tablet      Take 1 tablet by mouth        Blood Glucose Monitor System W/DEVICE Kit      Test 4 times a day, One touch Ultra blue " "test strips 3 months supply        carvedilol 6.25 MG tablet    COREG     Take 1 tablet by mouth 2 times daily (with meals).        FUROSEMIDE PO      Take 10 mg by mouth daily 1/2 of 20 mg tablet        Multi-vitamin Tabs tablet      Take 1 tablet by mouth daily        nitroGLYcerin 0.4 MG sublingual tablet    NITROSTAT     Place 0.4 mg under the tongue as needed for chest pain        NovoLOG PENFILL 100 UNIT/ML injection   Generic drug:  insulin aspart      Inject Subcutaneous 4 times daily (with meals and nightly)        OMEGA-3 FISH OIL PO      Take 2.8 g by mouth daily        pen needles 5/16\" 31G X 8 MM Misc      Dispense: Insulin Syringes:  1/2 cc syringes (# 270 )  - regular length 28 mm  needle        SPIRONOLACTONE PO      Take 25 mg by mouth every evening 1/2 of 50 mg tablet        VITAMIN D3 PO      Take 20,000 Units by mouth daily Liquid Vitamin D 2000 units per drop and takes 10 drops in smoothie.          "

## 2017-09-22 LAB
COPATH REPORT: NORMAL
PAP: NORMAL

## 2017-11-12 ENCOUNTER — HEALTH MAINTENANCE LETTER (OUTPATIENT)
Age: 67
End: 2017-11-12

## 2018-04-04 ENCOUNTER — OFFICE VISIT (OUTPATIENT)
Dept: OBGYN | Facility: CLINIC | Age: 68
End: 2018-04-04
Payer: COMMERCIAL

## 2018-04-04 VITALS
SYSTOLIC BLOOD PRESSURE: 136 MMHG | DIASTOLIC BLOOD PRESSURE: 80 MMHG | HEIGHT: 62 IN | BODY MASS INDEX: 33.31 KG/M2 | WEIGHT: 181 LBS

## 2018-04-04 DIAGNOSIS — C54.1 ENDOMETRIAL CANCER (H): Primary | ICD-10-CM

## 2018-04-04 PROCEDURE — 88175 CYTOPATH C/V AUTO FLUID REDO: CPT | Performed by: OBSTETRICS & GYNECOLOGY

## 2018-04-04 PROCEDURE — 99213 OFFICE O/P EST LOW 20 MIN: CPT | Performed by: OBSTETRICS & GYNECOLOGY

## 2018-04-04 NOTE — PROGRESS NOTES
SUBJECTIVE:                                                   Sydni Bonilla is a 68 year old female who presents to clinic today for the following health issue(s):  Patient presents with:  Repeat Pap Smear        HPI: The patient is seen at this time for follow-up of adenocarcinoma the endometrium treated surgically in 2015.  She has recently finished school.  She has no general complaints or GYN complaints of bleeding or pain.  She does have fairly severe atrophy.    No LMP recorded. Patient has had a hysterectomy..   Patient is not sexually active   reports that she has never smoked. She has never used smokeless tobacco.  STD testing offered?  Declined - not sexually active  Health maintenance updated:  yes    Today's PHQ-2 Score:   PHQ-2 ( 1999 Pfizer) 4/4/2018   Q1: Little interest or pleasure in doing things 0   Q2: Feeling down, depressed or hopeless 0   PHQ-2 Score 0       Problem list and histories reviewed & adjusted, as indicated.  Additional history: as documented.    Patient Active Problem List   Diagnosis     CARDIOVASCULAR SCREENING; LDL GOAL LESS THAN 160     Endometrial cancer (H)     Adenocarcinoma of the endometrium/uterus (H)     Uterine cancer (H)     Aphasia     Past Surgical History:   Procedure Laterality Date     COLONOSCOPY       ENT SURGERY      tonsillectomy     GYN SURGERY      D&C x3     HRW HB PF I&D SEBACEOUS CYST      x2     HYSTERECTOMY TOTAL ABDOMINAL, BILATERAL SALPINGO-OOPHORECTOMY, COMBINED N/A 11/12/2015    Procedure: COMBINED HYSTERECTOMY TOTAL ABDOMINAL, SALPINGO-OOPHORECTOMY;  Surgeon: Fidencio Mora MD;  Location: [facility]      Social History   Substance Use Topics     Smoking status: Never Smoker     Smokeless tobacco: Never Used     Alcohol use No           Current Outpatient Prescriptions   Medication Sig     insulin aspart (NOVOLOG PENFILL) 100 UNIT/ML injection Inject Subcutaneous 4 times daily (with meals and nightly)     aspirin EC 81 MG tablet Take 1 tablet by  "mouth     Insulin Pen Needle (PEN NEEDLES 5/16\") 31G X 8 MM MISC Dispense: Insulin Syringes:  1/2 cc syringes (# 270 )  - regular length 28 mm  needle     Blood Glucose Monitoring Suppl (BLOOD GLUCOSE MONITOR SYSTEM) W/DEVICE KIT Test 4 times a day, One touch Ultra blue test strips 3 months supply     FUROSEMIDE PO Take 10 mg by mouth daily 1/2 of 20 mg tablet     SPIRONOLACTONE PO Take 25 mg by mouth every evening 1/2 of 50 mg tablet     Cholecalciferol (VITAMIN D3 PO) Take 20,000 Units by mouth daily Liquid Vitamin D 2000 units per drop and takes 10 drops in smoothie.     nitroglycerin (NITROSTAT) 0.4 MG SL tablet Place 0.4 mg under the tongue as needed for chest pain     Omega-3 Fatty Acids (OMEGA-3 FISH OIL PO) Take 2.8 g by mouth daily      multivitamin, therapeutic with minerals (MULTI-VITAMIN) TABS Take 1 tablet by mouth daily     carvedilol (COREG) 6.25 MG tablet Take 1 tablet by mouth 2 times daily (with meals).     No current facility-administered medications for this visit.      Allergies   Allergen Reactions     Hytrin [Terazosin] Shortness Of Breath     No change in BP     Metformin      Other reaction(s): Other (see comments)  Acid reflux     Rosuvastatin      Other reaction(s): Abdominal Pain, Hyperglycemia, Myalgia     Adhesive Tape Itching     Amlodipine      Other reaction(s): Other (see comments)  Liver and upper abd pain     Apple      Other reaction(s): Other (see comments)  Tingling and tightness in throat     Atenolol Visual Disturbance     TIA type rx, vision loss     Avandia [Rosiglitazone]      Benicar [Olmesartan] Swelling     R sided edema, throat swelling & tightness     Beta Adrenergic Blockers      Birch Trees      Caffeine      Other reaction(s): Tachycardia     Cherry      Other reaction(s): Other (see comments)  Tingling and tightness in throat and mouth.  Other reaction(s): Other (see comments)  Tingling and tightness in throat and mouth.     Cherry [Wild Cherry Syrup]      " "Chlorthalidone      Other reaction(s): Other (see comments)  Potassium loss     Clonidine      Clonidine Derivatives      Other reaction(s): Other (see comments)  Thrush from patch, parotid pain from oral     Coffee Bean Extract [Coffea Arabica]      Cortisone      Cozaar [Losartan]      Dyazide [Hydrochlorothiazide W/Triamterene]      Other reaction(s): Other (see comments)  Severe abd cramping     Flagyl [Metronidazole]      Other reaction(s): Tachycardia     Gluten Meal      Hc [Hydrocortisone]      Hmg-Coa-R Inhibitors      Latex      Lisinopril Nausea and Vomiting     Lopid [Gemfibrozil]      Other reaction(s): Unknown     Lozol [Indapamide]      Other reaction(s): Unknown     Maple Tree      Mold      Molds & Smuts      Other reaction(s): Headache  Sorethroat, itchy ears,     Neomycin-Polymyxin B Gu      No Clinical Screening - See Comments      Other reaction(s): Abdominal Pain  Other reaction(s): Other (see comments)  HTN  \"too many to remember\"     Oak Trees      Oatmeal      Peanut Oil      Soy Allergy      Sulfa Drugs      Other reaction(s): Other (see comments)  Hemorrhages in white of eyes     Triamcinolone      Triple Antibiotic Itching     wound     Verapamil      Other reaction(s): Edema     Yeast      Other reaction(s): Other (see comments)  Patient states she develops Candida when eating yeast containing products. Vaginal odor, nasal discharge, fatigue, \"cloudy\" thinking.       ROS:  12 point review of systems negative other than symptoms noted below.  Constitutional: Fatigue  Genitourinary: Vaginal Dryness    OBJECTIVE:     /80  Ht 5' 2\" (1.575 m)  Wt 181 lb (82.1 kg)  BMI 33.11 kg/m2  Body mass index is 33.11 kg/(m^2).    Exam:  Constitutional:  Appearance: Well nourished, well developed alert, in no acute distress  Neck:  Lymph Nodes:  No lymphadenopathy present; Thyroid:  Gland size normal, nontender, no nodules or masses present on palpation  Chest:  Respiratory Effort:  Breathing " unlabored  Cardiovascular: Heart: Auscultation:  Regular rate, normal rhythm, no murmurs present  Gastrointestinal:  Abdominal Examination:  Abdomen nontender to palpation, tone normal without rigidity or guarding, no masses present, umbilicus without lesions; Liver/Spleen:  No hepatomegaly present, liver nontender to palpation; Hernias:  No hernias present  Lymphatic: Lymph Nodes:  No other lymphadenopathy present  Skin:General Inspection:  No rashes present, no lesions present, no areas of discoloration; Genitalia and Groin:  No rashes present, no lesions present, no areas of discoloration, no masses present.  Neurologic/Psychiatric:  Mental Status:  Oriented X3   Pelvic Exam:  External Genitalia:     Normal appearance for age, no discharge present, no tenderness present, no inflammatory lesions present, color normal  Vagina:     Normal vaginal vault without central or paravaginal defects, no discharge present, no inflammatory lesions present, no masses present  Bladder:     Nontender to palpation  Urethra:   Urethral Body:  Urethra palpation normal, urethra structural support normal   Urethral Meatus:  No erythema or lesions present  Cervix:     Surgically absent  Uterus:     Surgically absent  Adnexa:     Surgically absent  Perineum:     Perineum within normal limits, no evidence of trauma, no rashes or skin lesions present  Anus:     Anus within normal limits, no hemorrhoids present  Inguinal Lymph Nodes:     No lymphadenopathy present     In-Clinic Test Results:      ASSESSMENT/PLAN:                                                      68-year-old patient with history of adenocarcinoma of the endometrium treated surgically with no evidence of recurrence at this time.  She will return in 6 months.  We have asked her to increase her physical activity and hopefully lose some weight.            Fidencio Mora MD  [facility] [place]

## 2018-04-04 NOTE — MR AVS SNAPSHOT
"              After Visit Summary   4/4/2018    Sydni Bonilla    MRN: [id number]           Patient Information     Date Of Birth          1950        Visit Information        Provider Department      4/4/2018 9:15 AM Fidencio Mora MD [facility]        Today's Diagnoses     Endometrial cancer (H)    -  1       Follow-ups after your visit        Who to contact     If you have questions or need follow up information about today's clinic visit or your schedule please contact [facility] directly at [phone].  Normal or non-critical lab and imaging results will be communicated to you by coRankhart, letter or phone within 4 business days after the clinic has received the results. If you do not hear from us within 7 days, please contact the clinic through Viditt or phone. If you have a critical or abnormal lab result, we will notify you by phone as soon as possible.  Submit refill requests through Shopzilla or call your pharmacy and they will forward the refill request to us. Please allow 3 business days for your refill to be completed.          Additional Information About Your Visit        MyChart Information     Shopzilla gives you secure access to your electronic health record. If you see a primary care provider, you can also send messages to your care team and make appointments. If you have questions, please call your primary care clinic.  If you do not have a primary care provider, please call [phone] and they will assist you.        Care EveryWhere ID     This is your Care EveryWhere ID. This could be used by other organizations to access your [facility] medical records  [id number]        Your Vitals Were     Height BMI (Body Mass Index)                5' 2\" (1.575 m) 33.11 kg/m2           Blood Pressure from Last 3 Encounters:   04/04/18 136/80   09/20/17 124/60   06/06/17 124/72    Weight from Last 3 Encounters:   04/04/18 181 lb (82.1 kg)   09/20/17 176 lb 3.2 " oz (79.9 kg)   06/06/17 175 lb (79.4 kg)              We Performed the Following     Pap imaged thin layer diagnostic only        Primary Care Provider Office Phone # Fax #    Joan Vines [phone]       [facility] [address] [address]        Equal Access to Services     AILIN PATRICIA : Hadii aad ku hadasho Soomaali, waaxda luqadaha, qaybta kaalmada adeegyada, waxay idiin hayaan adeeg kharash laryleen . So [facility] [phone].    ATENCIÓN: Si habla español, tiene a salvador disposición servicios gratuitos de asistencia lingüística. Stefanieame al [phone].    We comply with applicable federal civil rights laws and [place] laws. We do not discriminate on the basis of race, color, national origin, age, disability, sex, sexual orientation, or gender identity.            Thank you!     Thank you for choosing [facility] [place]  for your care. Our goal is always to provide you with excellent care. Hearing back from our patients is one way we can continue to improve our services. Please take a few minutes to complete the written survey that you may receive in the mail after your visit with us. Thank you!             Your Updated Medication List - Protect others around you: Learn how to safely use, store and throw away your medicines at www.disposemymeds.org.          This list is accurate as of 4/4/18 10:22 AM.  Always use your most recent med list.                   Brand Name Dispense Instructions for use Diagnosis    aspirin EC 81 MG EC tablet      Take 1 tablet by mouth        Blood Glucose Monitor System W/DEVICE Kit      Test 4 times a day, One touch Ultra blue test strips 3 months supply        carvedilol 6.25 MG tablet    COREG     Take 1 tablet by mouth 2 times daily (with meals).        FUROSEMIDE PO      Take 10 mg by mouth daily 1/2 of 20 mg tablet        Multi-vitamin Tabs tablet      Take 1 tablet by mouth daily        nitroGLYcerin 0.4 MG  "sublingual tablet    NITROSTAT     Place 0.4 mg under the tongue as needed for chest pain        NovoLOG PENFILL 100 UNIT/ML injection   Generic drug:  insulin aspart      Inject Subcutaneous 4 times daily (with meals and nightly)        OMEGA-3 FISH OIL PO      Take 2.8 g by mouth daily        pen needles 5/16\" 31G X 8 MM Misc      Dispense: Insulin Syringes:  1/2 cc syringes (# 270 )  - regular length 28 mm  needle        SPIRONOLACTONE PO      Take 25 mg by mouth every evening 1/2 of 50 mg tablet        VITAMIN D3 PO      Take 20,000 Units by mouth daily Liquid Vitamin D 2000 units per drop and takes 10 drops in smoothie.          "

## 2018-04-06 LAB
COPATH REPORT: NORMAL
PAP: NORMAL

## 2018-10-01 ENCOUNTER — OFFICE VISIT (OUTPATIENT)
Dept: OBGYN | Facility: CLINIC | Age: 68
End: 2018-10-01
Payer: COMMERCIAL

## 2018-10-01 VITALS — WEIGHT: 181 LBS | BODY MASS INDEX: 33.11 KG/M2 | DIASTOLIC BLOOD PRESSURE: 80 MMHG | SYSTOLIC BLOOD PRESSURE: 126 MMHG

## 2018-10-01 DIAGNOSIS — C54.1 ADENOCARCINOMA OF THE ENDOMETRIUM/UTERUS (H): Primary | ICD-10-CM

## 2018-10-01 PROCEDURE — 87624 HPV HI-RISK TYP POOLED RSLT: CPT | Performed by: OBSTETRICS & GYNECOLOGY

## 2018-10-01 PROCEDURE — 88175 CYTOPATH C/V AUTO FLUID REDO: CPT | Performed by: OBSTETRICS & GYNECOLOGY

## 2018-10-01 PROCEDURE — 99213 OFFICE O/P EST LOW 20 MIN: CPT | Performed by: OBSTETRICS & GYNECOLOGY

## 2018-10-01 NOTE — PROGRESS NOTES
SUBJECTIVE:                                                   Sydni Bonilla is a 68 year old female who presents to clinic today for the following health issue(s):  Patient presents with:  RECHECK: repeat pap, last 4/4/18 WNL, Hx endometrial cancer      HPI: The patient is seen at this time in follow-up of adenocarcinoma of the endometrium surgically staged and treated in 2015.  She has had no signs of recurrence.  She denies any GI  problems and has had no vaginal bleeding.  She does discuss some weakness with descending stairs that appears to be a combination of issues with her knees and some balance issues.  We have asked her to review this with her primary physician.      No LMP recorded. Patient has had a hysterectomy..   Patient is not sexually active, No obstetric history on file..  Using hysterectomy for contraception.    reports that she has never smoked. She has never used smokeless tobacco.    STD testing offered?  Declined    Health maintenance updated:  yes    Today's PHQ-2 Score:   PHQ-2 ( 1999 Pfizer) 4/4/2018   Q1: Little interest or pleasure in doing things 0   Q2: Feeling down, depressed or hopeless 0   PHQ-2 Score 0     Today's PHQ-9 Score: No flowsheet data found.  Today's OMNICO-7 Score: No flowsheet data found.    Problem list and histories reviewed & adjusted, as indicated.  Additional history: as documented.    Patient Active Problem List   Diagnosis     CARDIOVASCULAR SCREENING; LDL GOAL LESS THAN 160     Endometrial cancer (H)     Adenocarcinoma of the endometrium/uterus (H)     Uterine cancer (H)     Aphasia     Past Surgical History:   Procedure Laterality Date     COLONOSCOPY       ENT SURGERY      tonsillectomy     GYN SURGERY      D&C x3     HRW HB PF I&D SEBACEOUS CYST      x2     HYSTERECTOMY TOTAL ABDOMINAL, BILATERAL SALPINGO-OOPHORECTOMY, COMBINED N/A 11/12/2015    Procedure: COMBINED HYSTERECTOMY TOTAL ABDOMINAL, SALPINGO-OOPHORECTOMY;  Surgeon: Fidencio Mora MD;   "[facility]      Social History   Substance Use Topics     Smoking status: Never Smoker     Smokeless tobacco: Never Used     Alcohol use No           Current Outpatient Prescriptions   Medication Sig     Blood Glucose Monitoring Suppl (BLOOD GLUCOSE MONITOR SYSTEM) W/DEVICE KIT Test 4 times a day, One touch Ultra blue test strips 3 months supply     carvedilol (COREG) 6.25 MG tablet Take 1 tablet by mouth 2 times daily (with meals).     Cholecalciferol (VITAMIN D3 PO) Take 20,000 Units by mouth daily Liquid Vitamin D 2000 units per drop and takes 10 drops in smoothie.     FUROSEMIDE PO Take 10 mg by mouth daily 1/2 of 20 mg tablet     insulin aspart (NOVOLOG PEN) 100 UNIT/ML injection 10 U with dinner and breakfast. Dose change only     insulin aspart (NOVOLOG PENFILL) 100 UNIT/ML injection Inject Subcutaneous 4 times daily (with meals and nightly)     Insulin Pen Needle (PEN NEEDLES 5/16\") 31G X 8 MM MISC Dispense: Insulin Syringes:  1/2 cc syringes (# 270 )  - regular length 28 mm  needle     multivitamin, therapeutic with minerals (MULTI-VITAMIN) TABS Take 1 tablet by mouth daily     nitroglycerin (NITROSTAT) 0.4 MG SL tablet Place 0.4 mg under the tongue as needed for chest pain     Omega-3 Fatty Acids (OMEGA-3 FISH OIL PO) Take 2.8 g by mouth daily      SPIRONOLACTONE PO Take 25 mg by mouth every evening 1/2 of 50 mg tablet     aspirin EC 81 MG tablet Take 1 tablet by mouth     No current facility-administered medications for this visit.      Allergies   Allergen Reactions     Hytrin [Terazosin] Shortness Of Breath     No change in BP     Metformin      Other reaction(s): Other (see comments)  Acid reflux     Rosuvastatin      Other reaction(s): Abdominal Pain, Hyperglycemia, Myalgia     Adhesive Tape Itching     Amlodipine      Other reaction(s): Other (see comments)  Liver and upper abd pain     Apple      Other reaction(s): Other (see comments)  Tingling and tightness in throat     Atenolol Visual " "Disturbance     TIA type rx, vision loss     Avandia [Rosiglitazone]      Benicar [Olmesartan] Swelling     R sided edema, throat swelling & tightness     Beta Adrenergic Blockers      Birch Trees      Caffeine      Other reaction(s): Tachycardia     Cherry      Other reaction(s): Other (see comments)  Tingling and tightness in throat and mouth.  Other reaction(s): Other (see comments)  Tingling and tightness in throat and mouth.     Cherry [Wild Cherry Syrup]      Chlorthalidone      Other reaction(s): Other (see comments)  Potassium loss     Clonidine      Clonidine Derivatives      Other reaction(s): Other (see comments)  Thrush from patch, parotid pain from oral     Coffee Bean Extract [Coffea Arabica]      Cortisone      Cozaar [Losartan]      Dyazide [Hydrochlorothiazide W/Triamterene]      Other reaction(s): Other (see comments)  Severe abd cramping     Flagyl [Metronidazole]      Other reaction(s): Tachycardia     Gluten Meal      Hc [Hydrocortisone]      Hmg-Coa-R Inhibitors      Latex      Lisinopril Nausea and Vomiting     Lopid [Gemfibrozil]      Other reaction(s): Unknown     Lozol [Indapamide]      Other reaction(s): Unknown     Maple Tree      Mold      Molds & Smuts      Other reaction(s): Headache  Sorethroat, itchy ears,     Neomycin-Polymyxin B Gu      No Clinical Screening - See Comments      Other reaction(s): Abdominal Pain  Other reaction(s): Other (see comments)  HTN  \"too many to remember\"     Oak Trees      Oatmeal      Peanut Oil      Soy Allergy      Sulfa Drugs      Other reaction(s): Other (see comments)  Hemorrhages in white of eyes     Triamcinolone      Triple Antibiotic Itching     wound     Verapamil      Other reaction(s): Edema     Yeast      Other reaction(s): Other (see comments)  Patient states she develops Candida when eating yeast containing products. Vaginal odor, nasal discharge, fatigue, \"cloudy\" thinking.       ROS:  Constitutional: Weight Gain    OBJECTIVE:     BP " 126/80  Wt 181 lb (82.1 kg)  Breastfeeding? No  BMI 33.11 kg/m2  Body mass index is 33.11 kg/(m^2).    Exam:  Constitutional:  Appearance: Well nourished, well developed alert, in no acute distress  Neck:  Lymph Nodes:  No lymphadenopathy present; Thyroid:  Gland size normal, nontender, no nodules or masses present on palpation  Chest:  Respiratory Effort:  Breathing unlabored  Cardiovascular: Heart: Auscultation:  Regular rate, normal rhythm, no murmurs present  Gastrointestinal:  Abdominal Examination:  Abdomen nontender to palpation, tone normal without rigidity or guarding, no masses present, umbilicus without lesions; Liver/Spleen:  No hepatomegaly present, liver nontender to palpation; Hernias:  No hernias present  Lymphatic: Lymph Nodes:  No other lymphadenopathy present  Skin:General Inspection:  No rashes present, no lesions present, no areas of discoloration; Genitalia and Groin:  No rashes present, no lesions present, no areas of discoloration, no masses present.  Neurologic/Psychiatric:  Mental Status:  Oriented X3   Pelvic Exam:  External Genitalia:     Normal appearance for age, no discharge present, no tenderness present, no inflammatory lesions present, color normal  Vagina:     Normal vaginal vault without central or paravaginal defects, no discharge present, no inflammatory lesions present, no masses present  Bladder:     Nontender to palpation  Urethra:   Urethral Body:  Urethra palpation normal, urethra structural support normal   Urethral Meatus:  No erythema or lesions present  Cervix:     Surgically absent  Uterus:     Surgically absent  Adnexa:     Surgically absent  Perineum:     Perineum within normal limits, no evidence of trauma, no rashes or skin lesions present  Anus:     Anus within normal limits, no hemorrhoids present  Inguinal Lymph Nodes:     No lymphadenopathy present     In-Clinic Test Results:      ASSESSMENT/PLAN:                                                         ICD-10-CM    1. Adenocarcinoma of the endometrium/uterus (H) C54.9 Pap imaged thin layer screen with HPV - recommended age 30 - 65     HPV High Risk Types DNA Cervical       68-year-old patient with history of adenocarcinoma the endometrium with no evidence of recurrence at this time.  She will address her balance issues with her primary care physician.  We will see her back in 6 months.        Fidencio Mora MD  [facility] [place]

## 2018-10-01 NOTE — MR AVS SNAPSHOT
After Visit Summary   10/1/2018    Sydni Bonilla    MRN: [id number]           Patient Information     Date Of Birth          1950        Visit Information        Provider Department      10/1/2018 9:15 AM Fidencio Mora MD [facility]        Today's Diagnoses     Adenocarcinoma of the endometrium/uterus (H)    -  1       Follow-ups after your visit        Who to contact     If you have questions or need follow up information about today's clinic visit or your schedule please contact [facility] directly at [phone].  Normal or non-critical lab and imaging results will be communicated to you by NightstaRxhart, letter or phone within 4 business days after the clinic has received the results. If you do not hear from us within 7 days, please contact the clinic through NightstaRxhart or phone. If you have a critical or abnormal lab result, we will notify you by phone as soon as possible.  Submit refill requests through Valneva or call your pharmacy and they will forward the refill request to us. Please allow 3 business days for your refill to be completed.          Additional Information About Your Visit        MyChart Information     Valneva gives you secure access to your electronic health record. If you see a primary care provider, you can also send messages to your care team and make appointments. If you have questions, please call your primary care clinic.  If you do not have a primary care provider, please call [phone] and they will assist you.        Care EveryWhere ID     This is your Care EveryWhere ID. This could be used by other organizations to access your [facility] medical records  [id number]        Your Vitals Were     Breastfeeding? BMI (Body Mass Index)                No 33.11 kg/m2           Blood Pressure from Last 3 Encounters:   10/01/18 126/80   04/04/18 136/80   09/20/17 124/60    Weight from Last 3 Encounters:   10/01/18 181 lb (82.1 kg)    04/04/18 181 lb (82.1 kg)   09/20/17 176 lb 3.2 oz (79.9 kg)              We Performed the Following     Pap imaged thin layer screen with HPV - recommended age 30 - 65        Primary Care Provider Office Phone # Fax #    Joan Vines [phone]       [facility] INTERNAL MEDICINE [address] [facility] [address]  [facility] [address]        Equal Access to Services     AILIN PATRICIA : Hadii aad ku hadasho Soomaali, waaxda luqadaha, qaybta kaalmada adeegyada, waxay idiin hayaan adesanty darnellrosalioedgardo arce . So [facility] [phone].    ATENCIÓN: Si lauren lassiter, tiene a salvador disposición servicios gratuitos de asistencia lingüística. Llame al [phone].    We comply with applicable federal civil rights laws and [place] laws. We do not discriminate on the basis of race, color, national origin, age, disability, sex, sexual orientation, or gender identity.            Thank you!     Thank you for choosing [facility] [place]  for your care. Our goal is always to provide you with excellent care. Hearing back from our patients is one way we can continue to improve our services. Please take a few minutes to complete the written survey that you may receive in the mail after your visit with us. Thank you!             Your Updated Medication List - Protect others around you: Learn how to safely use, store and throw away your medicines at www.disposemymeds.org.          This list is accurate as of 10/1/18  9:21 AM.  Always use your most recent med list.                   Brand Name Dispense Instructions for use Diagnosis    aspirin 81 MG EC tablet      Take 1 tablet by mouth        Blood Glucose Monitor System w/Device Kit      Test 4 times a day, One touch Ultra blue test strips 3 months supply        carvedilol 6.25 MG tablet    COREG     Take 1 tablet by mouth 2 times daily (with meals).        FUROSEMIDE PO      Take 10 mg by mouth daily 1/2 of 20 mg tablet        Multi-vitamin Tabs tablet      Take 1 tablet  "by mouth daily        nitroGLYcerin 0.4 MG sublingual tablet    NITROSTAT     Place 0.4 mg under the tongue as needed for chest pain        * NovoLOG PENFILL 100 UNIT/ML injection   Generic drug:  insulin aspart      Inject Subcutaneous 4 times daily (with meals and nightly)        * insulin aspart 100 UNIT/ML injection    NovoLOG PEN     10 U with dinner and breakfast. Dose change only        OMEGA-3 FISH OIL PO      Take 2.8 g by mouth daily        pen needles 5/16\" 31G X 8 MM Misc      Dispense: Insulin Syringes:  1/2 cc syringes (# 270 )  - regular length 28 mm  needle        SPIRONOLACTONE PO      Take 25 mg by mouth every evening 1/2 of 50 mg tablet        VITAMIN D3 PO      Take 20,000 Units by mouth daily Liquid Vitamin D 2000 units per drop and takes 10 drops in smoothie.        * Notice:  This list has 2 medication(s) that are the same as other medications prescribed for you. Read the directions carefully, and ask your doctor or other care provider to review them with you.      "

## 2018-10-03 LAB
COPATH REPORT: NORMAL
PAP: NORMAL

## 2018-10-05 LAB
FINAL DIAGNOSIS: NORMAL
HPV HR 12 DNA CVX QL NAA+PROBE: NEGATIVE
HPV16 DNA SPEC QL NAA+PROBE: NEGATIVE
HPV18 DNA SPEC QL NAA+PROBE: NEGATIVE
SPECIMEN DESCRIPTION: NORMAL
SPECIMEN SOURCE CVX/VAG CYTO: NORMAL

## 2018-10-24 ENCOUNTER — RADIANT APPOINTMENT (OUTPATIENT)
Dept: BONE DENSITY | Facility: CLINIC | Age: 68
End: 2018-10-24
Payer: COMMERCIAL

## 2018-10-24 DIAGNOSIS — Z78.0 ASYMPTOMATIC POSTMENOPAUSAL STATE: ICD-10-CM

## 2018-10-24 DIAGNOSIS — Z13.820 SCREENING FOR OSTEOPOROSIS: ICD-10-CM

## 2018-10-24 PROCEDURE — 77080 DXA BONE DENSITY AXIAL: CPT | Performed by: OBSTETRICS & GYNECOLOGY

## 2018-11-19 ENCOUNTER — HEALTH MAINTENANCE LETTER (OUTPATIENT)
Age: 68
End: 2018-11-19

## 2019-10-10 ENCOUNTER — TELEPHONE (OUTPATIENT)
Dept: OBGYN | Facility: CLINIC | Age: 69
End: 2019-10-10

## 2019-10-10 DIAGNOSIS — M85.80 OSTEOPENIA, SENILE: Primary | ICD-10-CM

## 2019-10-10 NOTE — TELEPHONE ENCOUNTER
Patient calling to schedule dexa scan - last had on 10/24/18 - was explained to pt that I cannot schedule without an order.     She states that her primary care is ordering. I asked her to call her pcp and have them send the order over - she got extremely upset with me and told me I was being rude and just told me to get the order from Dr. Mora.     Please advise --

## 2019-10-11 NOTE — TELEPHONE ENCOUNTER
BROWN today telling pt we have an order in [facility] now to schedule her Dexa appointment and asked her to call us back today

## 2019-10-24 ENCOUNTER — HOSPITAL ENCOUNTER (OUTPATIENT)
Dept: BONE DENSITY | Facility: CLINIC | Age: 69
Discharge: HOME | End: 2019-10-24
Attending: OBSTETRICS & GYNECOLOGY | Admitting: OBSTETRICS & GYNECOLOGY
Payer: COMMERCIAL

## 2019-10-24 DIAGNOSIS — M85.80 OSTEOPENIA, SENILE: ICD-10-CM

## 2019-10-24 PROCEDURE — 77080 DXA BONE DENSITY AXIAL: CPT

## 2019-11-01 ENCOUNTER — TELEPHONE (OUTPATIENT)
Dept: OBGYN | Facility: CLINIC | Age: 69
End: 2019-11-01

## 2019-11-01 NOTE — TELEPHONE ENCOUNTER
Notes recorded by Alethea Machuca APRN CNP on 10/25/2019 at 8:15 AM CDT  Letter with results sent to patient.     Informed the results were sent on 10/25 with explanation.  Should be receiving in the mail today or tomorrow.  Free feel to call with of questions or concerns.  Pt verbalized understanding and in agreement with plan.  Anat Waller RN on 11/1/2019 at 2:05 PM

## 2020-03-02 ENCOUNTER — HEALTH MAINTENANCE LETTER (OUTPATIENT)
Age: 70
End: 2020-03-02

## 2020-12-14 ENCOUNTER — HEALTH MAINTENANCE LETTER (OUTPATIENT)
Age: 70
End: 2020-12-14

## 2021-04-18 ENCOUNTER — HEALTH MAINTENANCE LETTER (OUTPATIENT)
Age: 71
End: 2021-04-18

## 2021-10-02 ENCOUNTER — HEALTH MAINTENANCE LETTER (OUTPATIENT)
Age: 71
End: 2021-10-02

## 2022-05-14 ENCOUNTER — HEALTH MAINTENANCE LETTER (OUTPATIENT)
Age: 72
End: 2022-05-14

## 2022-09-03 ENCOUNTER — HEALTH MAINTENANCE LETTER (OUTPATIENT)
Age: 72
End: 2022-09-03

## 2023-06-03 ENCOUNTER — HEALTH MAINTENANCE LETTER (OUTPATIENT)
Age: 73
End: 2023-06-03